# Patient Record
Sex: MALE | Race: WHITE | NOT HISPANIC OR LATINO | Employment: OTHER | URBAN - METROPOLITAN AREA
[De-identification: names, ages, dates, MRNs, and addresses within clinical notes are randomized per-mention and may not be internally consistent; named-entity substitution may affect disease eponyms.]

---

## 2018-11-19 ENCOUNTER — APPOINTMENT (OUTPATIENT)
Dept: RADIOLOGY | Facility: CLINIC | Age: 48
End: 2018-11-19
Payer: MEDICARE

## 2018-11-19 ENCOUNTER — OFFICE VISIT (OUTPATIENT)
Dept: URGENT CARE | Facility: CLINIC | Age: 48
End: 2018-11-19
Payer: MEDICARE

## 2018-11-19 ENCOUNTER — TELEPHONE (OUTPATIENT)
Dept: URGENT CARE | Facility: CLINIC | Age: 48
End: 2018-11-19

## 2018-11-19 VITALS
RESPIRATION RATE: 18 BRPM | HEART RATE: 102 BPM | HEIGHT: 68 IN | OXYGEN SATURATION: 95 % | WEIGHT: 242 LBS | DIASTOLIC BLOOD PRESSURE: 74 MMHG | SYSTOLIC BLOOD PRESSURE: 118 MMHG | BODY MASS INDEX: 36.68 KG/M2 | TEMPERATURE: 98.7 F

## 2018-11-19 DIAGNOSIS — J18.9 PNEUMONIA OF LEFT LUNG DUE TO INFECTIOUS ORGANISM, UNSPECIFIED PART OF LUNG: Primary | ICD-10-CM

## 2018-11-19 DIAGNOSIS — R05.9 COUGH: ICD-10-CM

## 2018-11-19 DIAGNOSIS — R05.9 COUGH: Primary | ICD-10-CM

## 2018-11-19 PROCEDURE — G0463 HOSPITAL OUTPT CLINIC VISIT: HCPCS | Performed by: PHYSICIAN ASSISTANT

## 2018-11-19 PROCEDURE — 71046 X-RAY EXAM CHEST 2 VIEWS: CPT

## 2018-11-19 PROCEDURE — 99203 OFFICE O/P NEW LOW 30 MIN: CPT | Performed by: PHYSICIAN ASSISTANT

## 2018-11-19 RX ORDER — AZITHROMYCIN 250 MG/1
TABLET, FILM COATED ORAL
Qty: 6 TABLET | Refills: 0 | Status: SHIPPED | OUTPATIENT
Start: 2018-11-19 | End: 2018-11-23

## 2018-11-19 RX ORDER — RISPERIDONE 2 MG/1
2 TABLET, FILM COATED ORAL 2 TIMES DAILY
COMMUNITY

## 2018-11-19 RX ORDER — LISINOPRIL 20 MG/1
20 TABLET ORAL DAILY
COMMUNITY

## 2018-11-19 NOTE — PROGRESS NOTES
[facility]        NAME: Ila Ferguson is a 51 y o  male  : 1970    MRN: [id number]  DATE: 2018  TIME: 12:07 PM    Assessment and Plan   Cough [R05]  1  Cough  XR chest pa & lateral         Patient Instructions     1  Cough- likely viral URI  -Chest xray is negative for infiltrate- if radiology read differs I will call you  -Increase fluids  -Tylenol/motrin  -Salt H20 gargles/throat lozenges  -Saline nasal spray  -Try using humidifier at nighttime    -Follow-up with PCP within 5 days  -Go to ER with worsening symptoms, difficulty breathing, high fever, or any signs of distress    Chief Complaint     Chief Complaint   Patient presents with    Cough     x3 days  Taking mucinex last dose AM     Nasal Congestion         History of Present Illness       Patient is a 44-year-old male with a medical history of mental retardation and hypertension who presents today with his father for evaluation of a cough that is been present for the past 3 days  Patient's father also states patient has had some nasal congestion  At times, the patient looked like he was short of breath  Patient tried taking Mucinex without any relief  Patient is unable to provide any history as he is nonverbal         Review of Systems   Review of Systems   Reason unable to perform ROS: ROS is per patient's father  Constitutional: Negative for chills and fever  HENT: Positive for rhinorrhea  Negative for ear pain and sore throat  Respiratory: Positive for cough  Negative for shortness of breath  Cardiovascular: Negative for chest pain           Current Medications       Current Outpatient Prescriptions:     lisinopril (ZESTRIL) 20 mg tablet, Take 20 mg by mouth daily, Disp: , Rfl:     risperiDONE (RisperDAL) 2 mg tablet, Take 2 mg by mouth 2 (two) times a day, Disp: , Rfl:     Current Allergies     Allergies as of 2018    (No Known Allergies)            The following portions of the patient's history were reviewed and updated as appropriate: allergies, current medications, past family history, past medical history, past social history, past surgical history and problem list      No past medical history on file  No past surgical history on file  No family history on file  Medications have been verified  Objective   /74   Pulse 102   Temp 98 7 °F (37 1 °C) (Temporal)   Resp 18   Ht 5' 8" (1 727 m)   Wt 110 kg (242 lb)   SpO2 95%   BMI 36 80 kg/m²        Physical Exam     Physical Exam   Constitutional: He is oriented to person, place, and time  He appears well-developed and well-nourished  No distress  HENT:   Right Ear: Tympanic membrane and external ear normal    Left Ear: Tympanic membrane and external ear normal    Nose: Nose normal    Mouth/Throat: Uvula is midline, oropharynx is clear and moist and mucous membranes are normal    Eyes: Pupils are equal, round, and reactive to light  Conjunctivae and EOM are normal    Neck: Normal range of motion  Neck supple  Cardiovascular: Normal rate, regular rhythm and normal heart sounds  Pulmonary/Chest: Effort normal and breath sounds normal  He has no wheezes  He has no rales  Lymphadenopathy:     He has no cervical adenopathy  Neurological: He is alert and oriented to person, place, and time  Skin: Skin is warm and dry  Psychiatric: He has a normal mood and affect  Nursing note and vitals reviewed

## 2018-11-19 NOTE — TELEPHONE ENCOUNTER
Spoke with patient's father and informed of positive xray results as read by radiology for left sided pneumonia  I called in a Rx for azithromycin to [facility] pharmacy  I instructed to follow-up with PCP

## 2018-11-19 NOTE — PATIENT INSTRUCTIONS
1  Cough- likely viral URI  -Chest xray is negative for infiltrate- if radiology read differs I will call you  -Increase fluids  -Tylenol/motrin  -Salt H20 gargles/throat lozenges  -Saline nasal spray  -Try using humidifier at nighttime    -Follow-up with PCP within 5 days  -Go to ER with worsening symptoms, difficulty breathing, high fever, or any signs of distress    Upper Respiratory Infection   AMBULATORY CARE:   An upper respiratory infection  is also called a common cold  It can affect your nose, throat, ears, and sinuses  Common signs and symptoms include the following:  Cold symptoms are usually worst for the first 3 to 5 days  You may have any of the following:  · Runny or stuffy nose    · Sneezing and coughing    · Sore throat or hoarseness    · Red, watery, and sore eyes    · Fatigue     · Chills and fever    · Headache, body aches, or sore muscles  Seek care immediately if:   · You have chest pain or trouble breathing  Contact your healthcare provider if:   · You have a fever over 102ºF (39°C)  · Your sore throat gets worse or you see white or yellow spots in your throat  · Your symptoms get worse after 3 to 5 days or your cold is not better in 14 days  · You have a rash anywhere on your skin  · You have large, tender lumps in your neck  · You have thick, green or yellow drainage from your nose  · You cough up thick yellow, green, or bloody mucus  · You have vomiting for more than 24 hours and cannot keep fluids down  · You have a bad earache  · You have questions or concerns about your condition or care  Treatment for a cold: There is no cure for the common cold  Colds are caused by viruses and do not get better with antibiotics  Most people get better in 7 to 14 days  You may continue to cough for 2 to 3 weeks  The following may help decrease your symptoms:  · Decongestants  help reduce nasal congestion and help you breathe more easily   If you take decongestant pills, they may make you feel restless or not able to sleep  Do not use decongestant sprays for more than a few days  · Cough suppressants  help reduce coughing  Ask your healthcare provider which type of cough medicine is best for you  · NSAIDs , such as ibuprofen, help decrease swelling, pain, and fever  NSAIDs can cause stomach bleeding or kidney problems in certain people  If you take blood thinner medicine, always ask your healthcare provider if NSAIDs are safe for you  Always read the medicine label and follow directions  · Acetaminophen  decreases pain and fever  It is available without a doctor's order  Ask how much to take and how often to take it  Follow directions  Read the labels of all other medicines you are using to see if they also contain acetaminophen, or ask your doctor or pharmacist  Acetaminophen can cause liver damage if not taken correctly  Do not use more than 4 grams (4,000 milligrams) total of acetaminophen in one day  Manage your cold:   · Rest as much as possible  Slowly start to do more each day  · Drink more liquids as directed  Liquids will help thin and loosen mucus so you can cough it up  Liquids will also help prevent dehydration  Liquids that help prevent dehydration include water, fruit juice, and broth  Do not drink liquids that contain caffeine  Caffeine can increase your risk for dehydration  Ask your healthcare provider how much liquid to drink each day  · Soothe a sore throat  Gargle with warm salt water  This helps your sore throat feel better  Make salt water by dissolving ¼ teaspoon salt in 1 cup warm water  You may also suck on hard candy or throat lozenges  You may use a sore throat spray  · Use a humidifier or vaporizer  Use a cool mist humidifier or a vaporizer to increase air moisture in your home  This may make it easier for you to breathe and help decrease your cough  · Use saline nasal drops as directed  These help relieve congestion  · Apply petroleum-based jelly around the outside of your nostrils  This can decrease irritation from blowing your nose  · Do not smoke  Nicotine and other chemicals in cigarettes and cigars can make your symptoms worse  They can also cause infections such as bronchitis or pneumonia  Ask your healthcare provider for information if you currently smoke and need help to quit  E-cigarettes or smokeless tobacco still contain nicotine  Talk to your healthcare provider before you use these products  Prevent spreading your cold to others:   · Try to stay away from other people during the first 2 to 3 days of your cold when it is more easily spread  · Do not share food or drinks  · Do not share hand towels with household members  · Wash your hands often, especially after you blow your nose  Turn away from other people and cover your mouth and nose with a tissue when you sneeze or cough  Follow up with your healthcare provider as directed:  Write down your questions so you remember to ask them during your visits  © 2017 [employer] Information is for End User's use only and may not be sold, redistributed or otherwise used for commercial purposes  All illustrations and images included in CareNotes® are the copyrighted property of [employer] , [employer]  or [employer]igar Pritchett  The above information is an  only  It is not intended as medical advice for individual conditions or treatments  Talk to your doctor, nurse or pharmacist before following any medical regimen to see if it is safe and effective for you